# Patient Record
Sex: MALE | Race: OTHER | HISPANIC OR LATINO | URBAN - METROPOLITAN AREA
[De-identification: names, ages, dates, MRNs, and addresses within clinical notes are randomized per-mention and may not be internally consistent; named-entity substitution may affect disease eponyms.]

---

## 2017-07-21 ENCOUNTER — EMERGENCY (EMERGENCY)
Facility: HOSPITAL | Age: 47
LOS: 0 days | Discharge: HOME | End: 2017-07-21

## 2017-07-21 DIAGNOSIS — M25.512 PAIN IN LEFT SHOULDER: ICD-10-CM

## 2017-07-21 PROBLEM — Z00.00 ENCOUNTER FOR PREVENTIVE HEALTH EXAMINATION: Status: ACTIVE | Noted: 2017-07-21

## 2017-09-14 ENCOUNTER — APPOINTMENT (OUTPATIENT)
Dept: INTERNAL MEDICINE | Facility: CLINIC | Age: 47
End: 2017-09-14

## 2018-11-28 ENCOUNTER — OUTPATIENT (OUTPATIENT)
Dept: OUTPATIENT SERVICES | Facility: HOSPITAL | Age: 48
LOS: 1 days | Discharge: HOME | End: 2018-11-28

## 2018-12-03 DIAGNOSIS — K05.4 PERIODONTOSIS: ICD-10-CM

## 2019-01-12 ENCOUNTER — EMERGENCY (EMERGENCY)
Facility: HOSPITAL | Age: 49
LOS: 0 days | Discharge: HOME | End: 2019-01-12
Admitting: PHYSICIAN ASSISTANT

## 2019-01-12 VITALS
DIASTOLIC BLOOD PRESSURE: 73 MMHG | RESPIRATION RATE: 18 BRPM | SYSTOLIC BLOOD PRESSURE: 121 MMHG | TEMPERATURE: 97 F | OXYGEN SATURATION: 98 % | HEART RATE: 65 BPM
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
## 2019-01-12 DIAGNOSIS — S49.90XA UNSPECIFIED INJURY OF SHOULDER AND UPPER ARM, UNSPECIFIED ARM, INITIAL ENCOUNTER: ICD-10-CM
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
## 2019-01-12 DIAGNOSIS — S43.005A UNSPECIFIED DISLOCATION OF LEFT SHOULDER JOINT, INITIAL ENCOUNTER: ICD-10-CM

## 2019-01-12 RX ORDER — IBUPROFEN 200 MG
600 TABLET ORAL ONCE
Qty: 0 | Refills: 0 | Status: COMPLETED | OUTPATIENT
Start: 2019-01-12 | End: 2019-01-12

## 2019-01-12 RX ADMIN — Medication 600 MILLIGRAM(S): at 08:46

## 2019-01-12 NOTE — ED PROVIDER NOTE - NS ED ROS FT
CONST: No fever, chills or bodyaches  CARD: No chest pain, palpitations  RESP: No SOB, cough  MS: see HPI  SKIN: No rashes  NEURO: No paresthesias or LOC

## 2019-01-12 NOTE — ED ADULT NURSE NOTE - OBJECTIVE STATEMENT
pt c/o left shoulder pain x 1-2 months; pt denies any trauma to area, no falls. Pain worsens with cold weather and difficulty sleeping on effected side.

## 2019-01-12 NOTE — ED PROVIDER NOTE - OBJECTIVE STATEMENT
47yo male with no significant PMHx, employed at a [employer], presents c/o L shoulder discomfort x1.5mths, worse with laying directly on shoulder and ROM. Patient denies direct injury. Reports pain radiates to entire shoulder. Did not try any OTC medication for pain. No numbness/tingling to arm. 49yo male with no significant PMHx, employed at a [employer], presents c/o L shoulder discomfort x1.5mths, worse with laying directly on shoulder and ROM. Patient reports falling onto L shoulder while soccer 2 yrs ago but never got evaluated. Reports pain radiates to entire shoulder. Did not try any OTC medication for pain. No numbness/tingling to arm.

## 2019-01-12 NOTE — ED PROVIDER NOTE - MEDICAL DECISION MAKING DETAILS
I have discussed the discharge plan with the patient. The patient agrees with the plan, as discussed.  The patient understands Emergency Department diagnosis is a preliminary diagnosis often based on limited information and that the patient must adhere to the follow-up plan as discussed.  The patient understands that if the symptoms worsen or if prescribed medications do not have the desired/planned effect that the patient may return to the Emergency Department at any time for further evaluation and treatment. I have discussed the discharge plan with the patient. The patient agrees with the plan, as discussed.  The patient understands Emergency Department diagnosis is a preliminary diagnosis often based on limited information and that the patient must adhere to the follow-up plan as discussed.  The patient understands that if the symptoms worsen or if prescribed medications do not have the desired/planned effect that the patient may return to the Emergency Department at any time for further evaluation and treatment.    Sling instructions, NSAIDs, ortho f/u.

## 2019-01-12 NOTE — ED PROVIDER NOTE - NSFOLLOWUPCLINICS_GEN_ALL_ED_FT
[facility]  Orthpedic  [address], [place]   Phone: [phone]  Fax:   Follow Up Time:

## 2019-01-12 NOTE — ED PROVIDER NOTE - PHYSICAL EXAMINATION
CONST: Well appearing in NAD  NECK: Non-tender, no meningeal signs  CARD: Normal S1 S2; Normal rate and rhythm  MS: Normal ROM in all extremities. Tenderness along L humeral head and L trapezius.  SKIN: Warm, dry, no acute rashes. Good turgor  NEURO: A&Ox3, No focal deficits. Strength 5/5 with no sensory deficits.

## 2020-03-23 ENCOUNTER — EMERGENCY (EMERGENCY)
Facility: HOSPITAL | Age: 50
LOS: 0 days | Discharge: HOME | End: 2020-03-23
Admitting: EMERGENCY MEDICINE
Payer: SUBSIDIZED

## 2020-03-23 VITALS
SYSTOLIC BLOOD PRESSURE: 155 MMHG | OXYGEN SATURATION: 98 % | HEART RATE: 92 BPM | DIASTOLIC BLOOD PRESSURE: 91 MMHG | TEMPERATURE: 98 F | RESPIRATION RATE: 17 BRPM

## 2020-03-23 DIAGNOSIS — F17.200 NICOTINE DEPENDENCE, UNSPECIFIED, UNCOMPLICATED: ICD-10-CM

## 2020-03-23 DIAGNOSIS — Z79.899 OTHER LONG TERM (CURRENT) DRUG THERAPY: ICD-10-CM

## 2020-03-23 DIAGNOSIS — K08.89 OTHER SPECIFIED DISORDERS OF TEETH AND SUPPORTING STRUCTURES: ICD-10-CM

## 2020-03-23 DIAGNOSIS — Z79.2 LONG TERM (CURRENT) USE OF ANTIBIOTICS: ICD-10-CM

## 2020-03-23 DIAGNOSIS — Z79.1 LONG TERM (CURRENT) USE OF NON-STEROIDAL ANTI-INFLAMMATORIES (NSAID): ICD-10-CM

## 2020-03-23 PROCEDURE — 99283 EMERGENCY DEPT VISIT LOW MDM: CPT

## 2020-03-23 RX ORDER — AMOXICILLIN 250 MG/5ML
1 SUSPENSION, RECONSTITUTED, ORAL (ML) ORAL
Qty: 21 | Refills: 0
Start: 2020-03-23 | End: 2020-03-29

## 2020-03-23 RX ORDER — IBUPROFEN 200 MG
1 TABLET ORAL
Qty: 21 | Refills: 0
Start: 2020-03-23 | End: 2020-03-29

## 2020-03-23 NOTE — ED PROVIDER NOTE - PATIENT PORTAL LINK FT
You can access the FollowMyHealth Patient Portal offered by [facility] by registering at the following website: [email or web address]. By joining [employer]’s FollowMyHealth portal, you will also be able to view your health information using other applications (apps) compatible with our system.

## 2020-03-23 NOTE — ED PROVIDER NOTE - PHYSICAL EXAMINATION
Physical Exam    Vital Signs: I have reviewed the initial vital signs.  Constitutional: well-nourished, appears stated age, no acute distress  HEENT: Non tender to palpation of teeth. +cerumen to left ear. No redness to left TM  Skin: No redness. Warm, dry  Neuro: AOx3, No focal deficits noted

## 2020-03-23 NOTE — ED PROVIDER NOTE - NS ED ROS FT
Constitutional: (-) fever  HEENT: (+) dental pain radiating to left ear  Skin: (-) rash  Muskuloskeletal: (-) swelling  Neurological: (-) altered mental status

## 2020-03-23 NOTE — ED PROVIDER NOTE - OBJECTIVE STATEMENT
Ukrainian speaking,  # [id number]  51 yo M c/o left upper dental pain radiating to his left ear x 2 days. Pain is intermittent, and is throbbing in nature. Requesting dental cleaning. No fevers.

## 2020-03-23 NOTE — ED PROVIDER NOTE - NSFOLLOWUPCLINICS_GEN_ALL_ED_FT
[facility]  Dental  [address] [place] [id number]  Phone: [phone]  Fax:   Follow Up Time: 1-3 Days

## 2024-09-26 NOTE — ED ADULT NURSE NOTE - DISCHARGE DATE/TIME
[de-identified] : 54yM pw increasing 4w of R shoulder pain, atraumatic.  Works in construction. Pt leads a very active lifestyle but the persistent shoulder pain has impacted this.  Rates the pain as 6/10, worse when laying directly on that side and has impaired sleep quality.  Repeated overhead activities including washing their hair exacerbates the severity of the pain.  NSAIDs have helped somewhat to alleviate the pain, which is located in the posterolateral shoulder and radiates to mid arm.  Denies stiffness, numbness, paresthesias.  Denies formal treatment such as PT, injections and therapy and would like to obtain a diagnosis and discuss treatment options.   7/18 interval - posterior shoulder pain fully resolved after injection, now with anterior shoulder pain  8/5 - persistent AC and lateral arm pain, mild relief from inj x2  8/15 interval - severe AC and lateral shoulder pain, unable to cross arm or raise arm overhead with force/weights without pain. MRI complete  9/16 - Pt returns after surgery stating the pain has gradually decreased each day.  Pt denies f/c, cp/sob, numbness/paresthesias, has followed postop instructions regarding rom and weight bearing status, has weaned prescription pain meds almost completely.  No issues with the dressing or wound.  Already feeling pain relief from DCE  9/26 - wants to confirm PT plans, small nylon remnant in portal incison 
12-Jan-2019 09:21